# Patient Record
Sex: FEMALE | Employment: FULL TIME | URBAN - METROPOLITAN AREA
[De-identification: names, ages, dates, MRNs, and addresses within clinical notes are randomized per-mention and may not be internally consistent; named-entity substitution may affect disease eponyms.]

---

## 2024-10-20 NOTE — PROGRESS NOTES
Amarilis Quan  : 1985  Primary:  [facility] (Other)  Secondary:  [facility] @ [place] [address] [address]  Phone: [phone]  Fax: [phone] Plan Frequency: 1x/week for 90 days  Plan of Care/Certification Expiration Date: 25            Plan of Care/Certification Expiration Date:  Plan of Care/Certification Expiration Date: 25    Frequency/Duration:   Plan Frequency: 1x/week for 90 days      Time In/Out:   Time In: 08  Time Out: 09      PT Visit Info:    Plan Frequency: 1x/week for 90 days  Total # of Visits to Date: 1  Progress Note Counter: 1      Visit Count:  1    OUTPATIENT PHYSICAL THERAPY:   Treatment Note 10/21/2024       Charge Capture        Episode  (UUI)               Treatment Diagnosis:    Urge urinary incontinence  Muscle weakness (generalized)    Medical/Referring Diagnosis:   Mixed incontinence  Rectocele  Overactive bladder  PFD (pelvic floor dysfunction)   Referring Physician: Laisha Cavazos APRN - NP  MD Orders: PT Eval and Treat   Return MD Appt:  TBD  Date of Onset: Onset Date: 10/03/22    Allergies: Patient has no allergy information on record.    Restrictions/Precautions: None    Interventions Planned (Treatment may consist of any combination of the following): See Assessment Note      Subjective Comments: See Evaluation Note from today    Pt gives verbal consent to internal vaginal assessment/treatment without chaperon present.    Initial Pain Level:     4 (back)/10   Post Session Pain Level:       2 (back)/10     Medications Last Reviewed: 10/21/2024    Updated Objective Findings: See Evaluation Note from today  RANGE OF MOTION Date:    To be assessed next visit Date:      RIGHT LEFT RIGHT LEFT   Lumbar Extension     Lumbar Flexion     Lumbar Rotation       Lumbar Lateral Flexion       Hip Extension       Hip Flexion       Hip Internal Rotation       Hip External Rotation       Hamstring

## 2024-10-20 NOTE — THERAPY EVALUATION
Amarilis Quan  : 1985  Primary:  [facility] (Other)  Secondary:  [facility] @ [place] [address] [address]  Phone: [phone]  Fax: [phone] Plan Frequency: 1x/week for 90 days    Plan of Care/Certification Expiration Date: 25              Plan of Care/Certification Expiration Date:  Plan of Care/Certification Expiration Date: 25    Frequency/Duration:   Plan Frequency: 1x/week for 90 days      Time In/Out:   Time In: 0802  Time Out: 09      PT Visit Info:    Plan Frequency: 1x/week for 90 days  Total # of Visits to Date: 1  Progress Note Counter: 1      Visit Count:  1                OUTPATIENT PHYSICAL THERAPY:             Initial Assessment 10/21/2024                 Episode (UUI)         Treatment Diagnosis:    Urge urinary incontinence  Muscle weakness (generalized)  Medical/Referring Diagnosis:    Mixed incontinence  Rectocele  Overactive bladder  PFD (pelvic floor dysfunction)      Referring Physician:  Laisha Cavazos APRN - NP  MD Orders:  PT Eval and Treat   Return MD Appt:  TBD  Date of Onset:  Onset Date: 10/03/22     Allergies:  Patient has no allergy information on record.    Restrictions/Precautions:  None       Medications Last Reviewed:  10/21/2024       SUBJECTIVE     History of Injury/Illness (Reason for Referral):  10/20/24: Ms. Quan is a 38 y.o. female referred to pelvic floor physical therapy (PFPT) by Laisha Cavazos,* 2/2   Mixed incontinence [N39.46]  Rectocele [N81.6]  Overactive bladder [N32.81]  PFD (pelvic floor dysfunction) [M62.89].    Pt has been on oral HRT since surgery 2 years ago. She reports she has chronic lower back pain after birth of daughter, still there. She is planning to look into surgery if PFPT is not working. She has tried kegels, stretching.    She does not have a history of DM.  She does not have a history of sleep apnea.    Urinary: Frequency 7-10 x/day, 3 
This is a surgical and/or non-medical patient.

## 2024-10-21 ENCOUNTER — HOSPITAL ENCOUNTER (OUTPATIENT)
Dept: PHYSICAL THERAPY | Age: 39
Setting detail: RECURRING SERIES
Discharge: HOME | End: 2024-10-24
Payer: OTHER GOVERNMENT

## 2024-10-21 DIAGNOSIS — M62.81 MUSCLE WEAKNESS (GENERALIZED): ICD-10-CM

## 2024-10-21 DIAGNOSIS — N39.41 URGE URINARY INCONTINENCE: Primary | ICD-10-CM

## 2024-10-21 PROCEDURE — 97161 PT EVAL LOW COMPLEX 20 MIN: CPT

## 2024-10-21 PROCEDURE — 97530 THERAPEUTIC ACTIVITIES: CPT

## 2024-10-21 PROCEDURE — 97110 THERAPEUTIC EXERCISES: CPT

## 2024-10-21 ASSESSMENT — PAIN SCALES - GENERAL: PAINLEVEL_OUTOF10: 4
